# Patient Record
Sex: MALE | Race: WHITE | Employment: FULL TIME | URBAN - METROPOLITAN AREA
[De-identification: names, ages, dates, MRNs, and addresses within clinical notes are randomized per-mention and may not be internally consistent; named-entity substitution may affect disease eponyms.]

---

## 2017-01-13 ENCOUNTER — TELEPHONE (OUTPATIENT)
Dept: NEUROLOGY | Facility: CLINIC | Age: 57
End: 2017-01-13

## 2017-07-13 PROBLEM — K21.9 GASTROESOPHAGEAL REFLUX DISEASE, ESOPHAGITIS PRESENCE NOT SPECIFIED: Status: ACTIVE | Noted: 2017-07-13

## 2017-07-13 PROBLEM — Z72.0 TOBACCO ABUSE: Status: ACTIVE | Noted: 2017-07-13

## 2017-07-13 PROBLEM — F17.200 TOBACCO DEPENDENCE: Status: ACTIVE | Noted: 2017-07-13

## 2017-07-13 PROBLEM — F51.02 ADJUSTMENT INSOMNIA: Status: ACTIVE | Noted: 2017-07-13

## 2017-07-13 PROBLEM — N40.1 BPH WITH OBSTRUCTION/LOWER URINARY TRACT SYMPTOMS: Status: ACTIVE | Noted: 2017-07-13

## 2017-07-13 PROBLEM — I10 ESSENTIAL HYPERTENSION, BENIGN: Status: ACTIVE | Noted: 2017-07-13

## 2017-07-13 PROBLEM — M17.11 PRIMARY OSTEOARTHRITIS OF RIGHT KNEE: Status: ACTIVE | Noted: 2017-07-13

## 2017-07-13 PROBLEM — R73.02 GLUCOSE INTOLERANCE (IMPAIRED GLUCOSE TOLERANCE): Status: ACTIVE | Noted: 2017-07-13

## 2017-07-13 PROBLEM — N13.8 BPH WITH OBSTRUCTION/LOWER URINARY TRACT SYMPTOMS: Status: ACTIVE | Noted: 2017-07-13

## 2017-07-13 PROBLEM — M71.21 BAKER'S CYST OF KNEE, RIGHT: Status: ACTIVE | Noted: 2017-07-13

## 2017-07-13 PROBLEM — E78.00 PURE HYPERCHOLESTEROLEMIA: Status: ACTIVE | Noted: 2017-07-13

## 2017-07-18 PROCEDURE — 82607 VITAMIN B-12: CPT | Performed by: FAMILY MEDICINE

## 2017-07-18 PROCEDURE — 84153 ASSAY OF PSA TOTAL: CPT | Performed by: FAMILY MEDICINE

## 2018-08-22 PROCEDURE — 80074 ACUTE HEPATITIS PANEL: CPT | Performed by: FAMILY MEDICINE

## 2018-08-27 PROBLEM — E55.9 VITAMIN D DEFICIENCY: Status: ACTIVE | Noted: 2018-08-27

## 2018-08-27 PROBLEM — K58.0 IRRITABLE BOWEL SYNDROME WITH DIARRHEA: Status: ACTIVE | Noted: 2018-08-27

## 2019-08-31 NOTE — ED PROVIDER NOTES
Patient Seen in: [facility] Emergency Department    History   Patient presents with:  Eval-D (detox)    Stated Complaint: ETOH    HPI    77-year-old male here for alcohol intoxication.   The patient is not quite sure why he is here he states he only Physical Exam    Patient is alert he is oriented x3 but slurring his words.   HEENT exam is atraumatic pupils are equal round react light extract muscles intact the next nontender lungs are clear cardiovascular exam shows regular rate and rhythm wit results for these tests on the individual orders. RAINBOW DRAW BLUE   RAINBOW DRAW LAVENDER   RAINBOW DRAW LIGHT GREEN   RAINBOW DRAW GOLD          Alcohol level is 176. The patient was observed for about 3 hours was able to ambulate without difficulty.

## 2019-08-31 NOTE — ED NOTES
Pt attempting to get out of bed. Pt redirectable and agreeable to remain in the exam cart. Pt given glass of ice water.

## 2019-08-31 NOTE — ED INITIAL ASSESSMENT (HPI)
Pt presents to ER with ETOH. pts wife called ambulance because \"she could not handle him like this. \" wife states he was drinking all day. Slurred speech. Skin warm and dry. Respirations equal and nonlabored.

## 2019-09-01 NOTE — ED NOTES
Pt sitting on cart. Asking to go home. Per wife \"I can't deal with him right now\" pt aware. Updated on plan.

## 2021-01-01 ENCOUNTER — NURSE ONLY (OUTPATIENT)
Dept: ELECTROPHYSIOLOGY | Facility: HOSPITAL | Age: 61
DRG: 432 | End: 2021-01-01
Attending: HOSPITALIST
Payer: COMMERCIAL

## 2021-01-01 ENCOUNTER — APPOINTMENT (OUTPATIENT)
Dept: ULTRASOUND IMAGING | Facility: HOSPITAL | Age: 61
DRG: 432 | End: 2021-01-01
Attending: EMERGENCY MEDICINE
Payer: COMMERCIAL

## 2021-01-01 ENCOUNTER — APPOINTMENT (OUTPATIENT)
Dept: CT IMAGING | Facility: HOSPITAL | Age: 61
DRG: 432 | End: 2021-01-01
Attending: NURSE PRACTITIONER
Payer: COMMERCIAL

## 2021-01-01 ENCOUNTER — APPOINTMENT (OUTPATIENT)
Dept: CT IMAGING | Facility: HOSPITAL | Age: 61
DRG: 432 | End: 2021-01-01
Attending: EMERGENCY MEDICINE
Payer: COMMERCIAL

## 2021-01-01 ENCOUNTER — APPOINTMENT (OUTPATIENT)
Dept: GENERAL RADIOLOGY | Facility: HOSPITAL | Age: 61
DRG: 432 | End: 2021-01-01
Attending: EMERGENCY MEDICINE
Payer: COMMERCIAL

## 2021-01-01 ENCOUNTER — APPOINTMENT (OUTPATIENT)
Dept: INTERVENTIONAL RADIOLOGY/VASCULAR | Facility: HOSPITAL | Age: 61
DRG: 432 | End: 2021-01-01
Attending: INTERNAL MEDICINE
Payer: COMMERCIAL

## 2021-01-01 ENCOUNTER — HOSPITAL ENCOUNTER (INPATIENT)
Dept: GENERAL RADIOLOGY | Facility: HOSPITAL | Age: 61
Discharge: HOME | DRG: 432 | End: 2021-01-01
Attending: HOSPITALIST
Payer: COMMERCIAL

## 2021-01-01 ENCOUNTER — APPOINTMENT (OUTPATIENT)
Dept: CV DIAGNOSTICS | Facility: HOSPITAL | Age: 61
DRG: 432 | End: 2021-01-01
Attending: INTERNAL MEDICINE
Payer: COMMERCIAL

## 2021-01-01 ENCOUNTER — HOSPITAL ENCOUNTER (INPATIENT)
Facility: HOSPITAL | Age: 61
LOS: 7 days | DRG: 432 | End: 2021-01-01
Attending: EMERGENCY MEDICINE | Admitting: INTERNAL MEDICINE
Payer: COMMERCIAL

## 2021-01-01 ENCOUNTER — APPOINTMENT (OUTPATIENT)
Dept: GENERAL RADIOLOGY | Facility: HOSPITAL | Age: 61
DRG: 432 | End: 2021-01-01
Attending: NURSE PRACTITIONER
Payer: COMMERCIAL

## 2021-01-01 ENCOUNTER — APPOINTMENT (OUTPATIENT)
Dept: CT IMAGING | Facility: HOSPITAL | Age: 61
DRG: 432 | End: 2021-01-01
Attending: INTERNAL MEDICINE
Payer: COMMERCIAL

## 2021-01-01 ENCOUNTER — APPOINTMENT (OUTPATIENT)
Dept: GENERAL RADIOLOGY | Facility: HOSPITAL | Age: 61
DRG: 432 | End: 2021-01-01
Attending: HOSPITALIST
Payer: COMMERCIAL

## 2021-01-01 ENCOUNTER — ANESTHESIA (OUTPATIENT)
Dept: MEDSURG UNIT | Facility: HOSPITAL | Age: 61
DRG: 432 | End: 2021-01-01
Payer: COMMERCIAL

## 2021-01-01 ENCOUNTER — ANESTHESIA EVENT (OUTPATIENT)
Dept: MEDSURG UNIT | Facility: HOSPITAL | Age: 61
DRG: 432 | End: 2021-01-01
Payer: COMMERCIAL

## 2021-01-01 VITALS
OXYGEN SATURATION: 80 % | SYSTOLIC BLOOD PRESSURE: 83 MMHG | BODY MASS INDEX: 34 KG/M2 | TEMPERATURE: 97 F | DIASTOLIC BLOOD PRESSURE: 48 MMHG | WEIGHT: 243.19 LBS

## 2021-01-01 DIAGNOSIS — E87.5 HYPERKALEMIA: ICD-10-CM

## 2021-01-01 DIAGNOSIS — N18.9 ACUTE RENAL FAILURE SUPERIMPOSED ON CHRONIC KIDNEY DISEASE, UNSPECIFIED CKD STAGE, UNSPECIFIED ACUTE RENAL FAILURE TYPE (HCC): ICD-10-CM

## 2021-01-01 DIAGNOSIS — E87.1 HYPONATREMIA: ICD-10-CM

## 2021-01-01 DIAGNOSIS — N17.9 ACUTE RENAL FAILURE SUPERIMPOSED ON CHRONIC KIDNEY DISEASE, UNSPECIFIED CKD STAGE, UNSPECIFIED ACUTE RENAL FAILURE TYPE (HCC): ICD-10-CM

## 2021-01-01 DIAGNOSIS — K72.90 HEPATIC ENCEPHALOPATHY (HCC): Primary | ICD-10-CM

## 2021-01-01 PROCEDURE — 02HV33Z INSERTION OF INFUSION DEVICE INTO SUPERIOR VENA CAVA, PERCUTANEOUS APPROACH: ICD-10-PCS | Performed by: INTERNAL MEDICINE

## 2021-01-01 PROCEDURE — 95720 EEG PHY/QHP EA INCR W/VEEG: CPT | Performed by: OTHER

## 2021-01-01 PROCEDURE — 71045 X-RAY EXAM CHEST 1 VIEW: CPT | Performed by: NURSE PRACTITIONER

## 2021-01-01 PROCEDURE — 3E033XZ INTRODUCTION OF VASOPRESSOR INTO PERIPHERAL VEIN, PERCUTANEOUS APPROACH: ICD-10-PCS | Performed by: INTERNAL MEDICINE

## 2021-01-01 PROCEDURE — 71045 X-RAY EXAM CHEST 1 VIEW: CPT | Performed by: HOSPITALIST

## 2021-01-01 PROCEDURE — 74176 CT ABD & PELVIS W/O CONTRAST: CPT | Performed by: INTERNAL MEDICINE

## 2021-01-01 PROCEDURE — 02HV33Z INSERTION OF INFUSION DEVICE INTO SUPERIOR VENA CAVA, PERCUTANEOUS APPROACH: ICD-10-PCS | Performed by: RADIOLOGY

## 2021-01-01 PROCEDURE — 99233 SBSQ HOSP IP/OBS HIGH 50: CPT | Performed by: OTHER

## 2021-01-01 PROCEDURE — 70450 CT HEAD/BRAIN W/O DYE: CPT | Performed by: EMERGENCY MEDICINE

## 2021-01-01 PROCEDURE — 4A133B1 MONITORING OF ARTERIAL PRESSURE, PERIPHERAL, PERCUTANEOUS APPROACH: ICD-10-PCS | Performed by: INTERNAL MEDICINE

## 2021-01-01 PROCEDURE — 4A133J1 MONITORING OF ARTERIAL PULSE, PERIPHERAL, PERCUTANEOUS APPROACH: ICD-10-PCS | Performed by: INTERNAL MEDICINE

## 2021-01-01 PROCEDURE — B548ZZA ULTRASONOGRAPHY OF SUPERIOR VENA CAVA, GUIDANCE: ICD-10-PCS | Performed by: INTERNAL MEDICINE

## 2021-01-01 PROCEDURE — 5A1D90Z PERFORMANCE OF URINARY FILTRATION, CONTINUOUS, GREATER THAN 18 HOURS PER DAY: ICD-10-PCS | Performed by: INTERNAL MEDICINE

## 2021-01-01 PROCEDURE — 99255 IP/OBS CONSLTJ NEW/EST HI 80: CPT | Performed by: NURSE PRACTITIONER

## 2021-01-01 PROCEDURE — 03HY32Z INSERTION OF MONITORING DEVICE INTO UPPER ARTERY, PERCUTANEOUS APPROACH: ICD-10-PCS | Performed by: INTERNAL MEDICINE

## 2021-01-01 PROCEDURE — 99255 IP/OBS CONSLTJ NEW/EST HI 80: CPT | Performed by: OTHER

## 2021-01-01 PROCEDURE — 93306 TTE W/DOPPLER COMPLETE: CPT | Performed by: INTERNAL MEDICINE

## 2021-01-01 PROCEDURE — 71045 X-RAY EXAM CHEST 1 VIEW: CPT | Performed by: EMERGENCY MEDICINE

## 2021-01-01 PROCEDURE — 70450 CT HEAD/BRAIN W/O DYE: CPT | Performed by: NURSE PRACTITIONER

## 2021-01-01 PROCEDURE — 0BH18EZ INSERTION OF ENDOTRACHEAL AIRWAY INTO TRACHEA, VIA NATURAL OR ARTIFICIAL OPENING ENDOSCOPIC: ICD-10-PCS | Performed by: ANESTHESIOLOGY

## 2021-01-01 PROCEDURE — B548ZZA ULTRASONOGRAPHY OF SUPERIOR VENA CAVA, GUIDANCE: ICD-10-PCS | Performed by: RADIOLOGY

## 2021-01-01 PROCEDURE — 5A1955Z RESPIRATORY VENTILATION, GREATER THAN 96 CONSECUTIVE HOURS: ICD-10-PCS | Performed by: ANESTHESIOLOGY

## 2021-01-01 RX ORDER — DEXTROSE MONOHYDRATE 25 G/50ML
50 INJECTION, SOLUTION INTRAVENOUS AS NEEDED
Status: DISCONTINUED | OUTPATIENT
Start: 2021-01-01 | End: 2021-01-01

## 2021-01-01 RX ORDER — FOLIC ACID 1 MG/1
1 TABLET ORAL DAILY
Status: DISCONTINUED | OUTPATIENT
Start: 2021-01-01 | End: 2021-01-01

## 2021-01-01 RX ORDER — LACTULOSE 20 G/30ML
20 SOLUTION ORAL 3 TIMES DAILY
Status: DISCONTINUED | OUTPATIENT
Start: 2021-01-01 | End: 2021-01-01

## 2021-01-01 RX ORDER — PHENYTOIN SODIUM 50 MG/ML
200 INJECTION, SOLUTION INTRAMUSCULAR; INTRAVENOUS EVERY 8 HOURS SCHEDULED
Status: DISCONTINUED | OUTPATIENT
Start: 2021-01-01 | End: 2021-01-01

## 2021-01-01 RX ORDER — LEVETIRACETAM 500 MG/5ML
1000 INJECTION, SOLUTION, CONCENTRATE INTRAVENOUS ONCE
Status: COMPLETED | OUTPATIENT
Start: 2021-01-01 | End: 2021-01-01

## 2021-01-01 RX ORDER — RUFINAMIDE 40 MG/ML
1 SUSPENSION ORAL DAILY
Status: DISCONTINUED | OUTPATIENT
Start: 2021-01-01 | End: 2021-01-01

## 2021-01-01 RX ORDER — LORAZEPAM 2 MG/ML
2 INJECTION INTRAMUSCULAR EVERY 10 MIN PRN
Status: DISCONTINUED | OUTPATIENT
Start: 2021-01-01 | End: 2021-01-01 | Stop reason: ALTCHOICE

## 2021-01-01 RX ORDER — MELATONIN
100 DAILY
Status: DISCONTINUED | OUTPATIENT
Start: 2021-01-01 | End: 2021-01-01

## 2021-01-01 RX ORDER — LORAZEPAM 2 MG/ML
INJECTION INTRAMUSCULAR
Status: COMPLETED
Start: 2021-01-01 | End: 2021-01-01

## 2021-01-01 RX ORDER — DEXTROSE MONOHYDRATE 25 G/50ML
INJECTION, SOLUTION INTRAVENOUS
Status: COMPLETED
Start: 2021-01-01 | End: 2021-01-01

## 2021-01-01 RX ORDER — LORAZEPAM 2 MG/ML
2 INJECTION INTRAMUSCULAR
Status: DISCONTINUED | OUTPATIENT
Start: 2021-01-01 | End: 2021-01-01 | Stop reason: ALTCHOICE

## 2021-01-01 RX ORDER — SODIUM CHLORIDE 9 MG/ML
INJECTION, SOLUTION INTRAVENOUS CONTINUOUS
Status: DISCONTINUED | OUTPATIENT
Start: 2021-01-01 | End: 2021-01-01

## 2021-01-01 RX ORDER — POLYETHYLENE GLYCOL 3350 17 G/17G
17 POWDER, FOR SOLUTION ORAL DAILY PRN
Status: DISCONTINUED | OUTPATIENT
Start: 2021-01-01 | End: 2021-01-01

## 2021-01-01 RX ORDER — HEPARIN SODIUM 5000 [USP'U]/ML
INJECTION, SOLUTION INTRAVENOUS; SUBCUTANEOUS
Status: COMPLETED
Start: 2021-01-01 | End: 2021-01-01

## 2021-01-01 RX ORDER — POTASSIUM CHLORIDE 14.9 MG/ML
20 INJECTION INTRAVENOUS ONCE
Status: DISCONTINUED | OUTPATIENT
Start: 2021-01-01 | End: 2021-01-01

## 2021-01-01 RX ORDER — CHLORHEXIDINE GLUCONATE 0.12 MG/ML
15 RINSE ORAL
Status: DISCONTINUED | OUTPATIENT
Start: 2021-01-01 | End: 2021-01-01

## 2021-01-01 RX ORDER — LORAZEPAM 2 MG/ML
1 INJECTION INTRAMUSCULAR ONCE
Status: COMPLETED | OUTPATIENT
Start: 2021-01-01 | End: 2021-01-01

## 2021-01-01 RX ORDER — LEVETIRACETAM 500 MG/5ML
1000 INJECTION, SOLUTION, CONCENTRATE INTRAVENOUS EVERY 12 HOURS
Status: DISCONTINUED | OUTPATIENT
Start: 2021-01-01 | End: 2021-01-01

## 2021-01-01 RX ORDER — BISACODYL 10 MG
10 SUPPOSITORY, RECTAL RECTAL
Status: DISCONTINUED | OUTPATIENT
Start: 2021-01-01 | End: 2021-01-01

## 2021-01-01 RX ORDER — LORAZEPAM 2 MG/ML
3 INJECTION INTRAMUSCULAR
Status: DISCONTINUED | OUTPATIENT
Start: 2021-01-01 | End: 2021-01-01 | Stop reason: ALTCHOICE

## 2021-01-01 RX ORDER — HEPARIN SODIUM 1000 [USP'U]/ML
1.5 INJECTION, SOLUTION INTRAVENOUS; SUBCUTANEOUS AS NEEDED
Status: DISCONTINUED | OUTPATIENT
Start: 2021-01-01 | End: 2021-01-01

## 2021-01-01 RX ORDER — MIDAZOLAM HYDROCHLORIDE 1 MG/ML
2 INJECTION INTRAMUSCULAR; INTRAVENOUS ONCE
Status: COMPLETED | OUTPATIENT
Start: 2021-01-01 | End: 2021-01-01

## 2021-01-01 RX ORDER — LEVETIRACETAM 500 MG/5ML
500 INJECTION, SOLUTION, CONCENTRATE INTRAVENOUS EVERY 12 HOURS
Status: DISCONTINUED | OUTPATIENT
Start: 2021-01-01 | End: 2021-01-01

## 2021-01-01 RX ORDER — ALBUMIN (HUMAN) 12.5 G/50ML
25 SOLUTION INTRAVENOUS EVERY 6 HOURS
Status: COMPLETED | OUTPATIENT
Start: 2021-01-01 | End: 2021-01-01

## 2021-01-01 RX ORDER — LORAZEPAM 2 MG/ML
1 INJECTION INTRAMUSCULAR EVERY 30 MIN PRN
Status: DISCONTINUED | OUTPATIENT
Start: 2021-01-01 | End: 2021-01-01 | Stop reason: ALTCHOICE

## 2021-01-01 RX ORDER — ETOMIDATE 2 MG/ML
INJECTION INTRAVENOUS
Status: COMPLETED
Start: 2021-01-01 | End: 2021-01-01

## 2021-01-01 RX ORDER — OCTREOTIDE ACETATE 100 UG/ML
100 INJECTION, SOLUTION INTRAVENOUS; SUBCUTANEOUS EVERY 8 HOURS
Status: DISCONTINUED | OUTPATIENT
Start: 2021-01-01 | End: 2021-01-01

## 2021-01-01 RX ORDER — SENNOSIDES 8.8 MG/5ML
10 LIQUID ORAL NIGHTLY PRN
Status: DISCONTINUED | OUTPATIENT
Start: 2021-01-01 | End: 2021-01-01

## 2021-01-01 RX ORDER — ETOMIDATE 2 MG/ML
10 INJECTION INTRAVENOUS ONCE
Status: COMPLETED | OUTPATIENT
Start: 2021-01-01 | End: 2021-01-01

## 2021-01-01 RX ORDER — LORAZEPAM 2 MG/ML
4 INJECTION INTRAMUSCULAR EVERY 10 MIN PRN
Status: DISCONTINUED | OUTPATIENT
Start: 2021-01-01 | End: 2021-01-01 | Stop reason: ALTCHOICE

## 2021-01-01 RX ORDER — POTASSIUM CHLORIDE 14.9 MG/ML
20 INJECTION INTRAVENOUS ONCE
Status: COMPLETED | OUTPATIENT
Start: 2021-01-01 | End: 2021-01-01

## 2021-01-01 RX ORDER — PANTOPRAZOLE SODIUM 40 MG/1
40 TABLET, DELAYED RELEASE ORAL
Status: DISCONTINUED | OUTPATIENT
Start: 2021-01-01 | End: 2021-01-01

## 2021-01-01 RX ORDER — MIDODRINE HYDROCHLORIDE 10 MG/1
10 TABLET ORAL 3 TIMES DAILY
Status: DISCONTINUED | OUTPATIENT
Start: 2021-01-01 | End: 2021-01-01

## 2021-01-01 RX ORDER — LIDOCAINE HYDROCHLORIDE 10 MG/ML
INJECTION, SOLUTION INFILTRATION; PERINEURAL
Status: COMPLETED
Start: 2021-01-01 | End: 2021-01-01

## 2021-01-01 RX ORDER — LORAZEPAM 2 MG/ML
0.5 INJECTION INTRAMUSCULAR ONCE
Status: COMPLETED | OUTPATIENT
Start: 2021-01-01 | End: 2021-01-01

## 2021-01-01 RX ORDER — PHENYTOIN SODIUM 50 MG/ML
100 INJECTION, SOLUTION INTRAMUSCULAR; INTRAVENOUS EVERY 8 HOURS SCHEDULED
Status: DISCONTINUED | OUTPATIENT
Start: 2021-01-01 | End: 2021-01-01

## 2021-01-01 RX ORDER — LACTULOSE 20 G/30ML
30 SOLUTION ORAL 3 TIMES DAILY
Status: DISCONTINUED | OUTPATIENT
Start: 2021-01-01 | End: 2021-01-01

## 2021-01-01 RX ORDER — PHENYTOIN SODIUM 50 MG/ML
150 INJECTION, SOLUTION INTRAMUSCULAR; INTRAVENOUS EVERY 8 HOURS SCHEDULED
Status: DISCONTINUED | OUTPATIENT
Start: 2021-01-01 | End: 2021-01-01

## 2021-01-01 RX ORDER — MELATONIN
100 DAILY
Status: DISCONTINUED | OUTPATIENT
Start: 2021-10-15 | End: 2021-01-01

## 2021-10-04 PROBLEM — E87.5 HYPERKALEMIA: Status: ACTIVE | Noted: 2021-01-01

## 2021-10-04 PROBLEM — K72.90 HEPATIC ENCEPHALOPATHY (HCC): Status: ACTIVE | Noted: 2021-01-01

## 2021-10-04 PROBLEM — N17.9 ACUTE RENAL FAILURE SUPERIMPOSED ON CHRONIC KIDNEY DISEASE, UNSPECIFIED CKD STAGE, UNSPECIFIED ACUTE RENAL FAILURE TYPE (HCC): Status: ACTIVE | Noted: 2021-01-01

## 2021-10-04 PROBLEM — N18.9 ACUTE RENAL FAILURE SUPERIMPOSED ON CHRONIC KIDNEY DISEASE, UNSPECIFIED CKD STAGE, UNSPECIFIED ACUTE RENAL FAILURE TYPE (HCC): Status: ACTIVE | Noted: 2021-01-01

## 2021-10-04 PROBLEM — E87.1 HYPONATREMIA: Status: ACTIVE | Noted: 2021-01-01

## 2021-10-04 NOTE — PLAN OF CARE
Received pt from the ER. Pt awakens to voice, but doesn't follow any commands. Wife at bedside and she provided history. Updates given. Bp dropping started on levo. Ngt placed. Will continue to monitor. See flowsheets.

## 2021-10-04 NOTE — ED NOTES
Patient in from home via ems. Per ems, patient has been feeling sick for a couple weeks. Wife states patient has not been eating. Patient w/ jaundice, rounded belly, swollen extremities.  Awake and alert, not responding to  line w/ [facility] in

## 2021-10-04 NOTE — H&P
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 , Disp: , Rfl:   Multiple Vitamin (ONE-DAILY MULTI VITAMINS) Oral Tab, Take 1 tablet by mouth daily. , Disp: , Rfl:         Review of Systems:   A comprehensive 14 point review of systems was completed.     Pertinent positives and negatives no Encephalopathy  -pt with significant alcohol history, concerning for cirrhosis development  -ammonia 145  -lactulose   -GI consulted    Hyponatremia  -etiology uncertain  -urine studies  -nephrology consulted    GELA  -likely secondary to pre renal state fr

## 2021-10-04 NOTE — CONSULTS
Critical Care H&P/Consult       NAME: Erich Sanchez - ROOM: 832/698-Y - MRN: [id number] - Age: 64year old - :  3/28/1960    Date of Admission: 10/4/2021 11:16 AM  Admission Diagnosis: [facility] [K72.90]  Hyperkalemia [E87.5]  Hyponatr Take 1 tablet (10 mg total) by mouth daily. , Disp: 90 tablet, Rfl: 1  losartan 100 MG Oral Tab, TAKE ONE TABLET BY MOUTH DAILY. , Disp: 90 tablet, Rfl: 0  Thiamine 50 MG Oral Cap, Take 1 capsule by mouth daily. , Disp: 90 capsule, Rfl: 3  OMEPRAZOLE 20 MG Or week      Drug use: No      Sexual activity: Yes        Partners: Female    Other Topics      Concerns:         Service: Yes          [place]        Blood Transfusions: No        Caffeine Concern: Yes          3 cups/day        Occupational [employer]ur of Onset   • Heart Disease Father    • Diabetes Father    • Other (Other) Father         leg amputated   • Macular degeneration Mother    • Prostate Cancer Maternal Grandfather    • Infectious Disease Paternal Grandmother         post op   • Heart Attack P Abdomen:     Soft, non-tender, bowel sounds active all four quadrants,     no masses, no organomegaly   Extremities:   2+ edema   Pulses:   2+ and symmetric all extremities   Skin:   Jaundiced appearing   Neurologic:   CNII-XII intact.  Normal strength, s

## 2021-10-04 NOTE — ED QUICK NOTES
Patients emergency contact called, Claudio Johnson. Per Claudio Johnson, patient is usually awake and alert, speaks english well. On July 4th, patient started feeling \"weird. \" He has not been able to shower for weeks, has not been eating, and has been feeling sick.  This

## 2021-10-04 NOTE — CONSULTS
[facility]    Report of Consultation    Erasmo Interiano Patient Status:  Inpatient    3/28/1960 MRN [id number]   [facility] 4SW-A Attending Alton Coffey, DO   Hosp Day # 0 PCP Yonathan Wiseman MD       REASON FOR CONSULT:     GELA  Hy Attack Paternal Grandfather       reports that he quit smoking about 17 months ago. His smoking use included cigarettes. He has a 20.00 pack-year smoking history.  He has never used smokeless tobacco. He reports current alcohol use of about 20.0 standard dr 10/04/2021    ALB 1.5 (L) 10/04/2021    GLOBULIN 4.2 10/04/2021     (L) 10/04/2021    K 5.2 (H) 10/04/2021    CL 90 (L) 10/04/2021    CO2 15.0 (L) 10/04/2021     Lab Results   Component Value Date    WBC 9.0 10/04/2021    RBC 2.98 (L) 10/04/2021    H contrast  -- start empiric albumin 25% in case of HRS  -- give thiamine now in case of need for D5 containing IV fluids  -- continue jeffries  -- avoid NSAIDs, IV contrast, fleets  -- no indication for RRT at this time but will monitor for need    Hyponatremi

## 2021-10-04 NOTE — ED PROVIDER NOTES
Patient Seen in: [facility] Emergency Department      History   Patient presents with:  Jaundice  Altered Mental Status    Stated Complaint: AMS    Subjective:   HPI    Patient is a 31-year-old male brought by paramedics for altered mental status. Abdomen is soft. Comments: Moderate diffuse distention. Does not seem tender. Musculoskeletal:         General: Normal range of motion. Cervical back: Normal range of motion and neck supple. Skin:     General: Skin is warm and dry.       Comm components within normal limits   CBC W/ DIFFERENTIAL - Abnormal; Notable for the following components:    RBC 2.98 (*)     HGB 10.1 (*)     HCT 27.2 (*)     PLT 97.0 (*)     MCHC 37.1 (*)     Lymphocyte Absolute 0.74 (*)     All other components within no There is no midline shift. There are no intraparenchymal brain abnormalities. There is nothing specific for acute infarct. There is no hemorrhage or mass lesion. SINUSES:           No sign of acute sinusitis.   MASTOIDS:          No sign of acute inflam Per old records/social histories he does have fairly heavy alcohol use in the past and overall this is suspicious for possible cirrhosis with hepatic encephalopathy. Blood pressure is little bit low at 85/51.   Labs ordered including coags, ammonia, blood minutes of critical care time (exclusive of billable procedures) was administered to manage the patient's critical lab values due to his acute renal failure, hyponatremia, hepatic encephalopathy, hypotension.   This involved direct patient intervention, com

## 2021-10-05 NOTE — PROGRESS NOTES
[facility] Pharmacy Note:  Initiation of Stress Ulcer Prophylaxis     Cameron Kam is a 64year old patient and meets criteria for the initiation of stress ulcer prophylaxis based on the presence of: Medication(s) on home medication list.    pantoprazole (PROTON

## 2021-10-05 NOTE — PAYOR COMM NOTE
--------------  ADMISSION REVIEW     Payor: [facility]  Subscriber #:  [id number]  Authorization Number: [id number]      Admit date: 10/4/21  Admit time:  3:53 PM       History   HPI    Patient is a 35-year-old male brought by paramedics for a Difficult to assess the rest of his cranial nerves. Psychiatric:      Comments: Restless in bed. Not combative or agitated.      Labs Reviewed   COMP METABOLIC PANEL (14) - Abnormal; Notable for the following components:       Result Value    Glucose 122 Disposition:  Admit  10/4/2021  2:44 pm    History and Physical   Chief Complaint: AMS  History of Present Illness: Erasmo Interiano is a 64year old male with history of gerd, htn, hld and glucose intolerance presenting with AMS.  Patient is unable to giv AMS.     AMS  -etiology uncertain  -likely multifactorial  -ct brain negative  -hyponatremia  -hepatic encephalopathy  -other  -may need neuro consult if not improving with below treatment in 1-2 days, consider EEG also given possible withdrawal pathology encephalopathy vs uremia  -EtOH negative  -treat ammonia level- rising this AM, increased lactulose  -consider EEG for possible post-ictal seizure activity if no improvement- no seizure activity witnessed as of yet  2.  Lactic Acidosis  -due to hypotension Action Dose Route     10/4/2021 2116 Given  Oral       LORazepam (ATIVAN) injection 0.5 mg     Date Action Dose Route     10/4/2021 1446 Given  Intravenous       norepinephrine (LEVOPHED) 4 mg/250 ml premix infusion     Date Action Dose Route     10/5/2021

## 2021-10-05 NOTE — PROCEDURES
[facility]  Procedure Note    Teodora Walter Patient Status:  Inpatient    3/28/1960 MRN [id number]   Location [facility] Attending Luis Alfredo Hernadez MD   Hosp Day # 1 PCP Sammi Jay MD     Procedure: right internal jugul

## 2021-10-05 NOTE — PROGRESS NOTES
[facility]      Sepsis Reassessment Note    BP 99/60   Pulse 85   Temp 98.1 °F (36.7 °C) (Temporal)   Resp 19   Wt 227 lb 1.2 oz (103 kg)   SpO2 100%   BMI 32.12 kg/m²      I completed the sepsis reassessment at 1630    Cardiac:  Regularity: Regular

## 2021-10-05 NOTE — DIETARY MALNUTRITION NOTE
[facility]    NUTRITION ASSESSMENT    Pt meets moderate malnutrition criteria.     CRITERIA FOR MALNUTRITION DIAGNOSIS:  Criteria for non-severe malnutrition diagnosis: chronic illness related to energy intake less than75% for greater than 1 month and lb)  08/27/18 : 103.4 kg (228 lb)       NUTRITION:  Diet:NPO + TF   Oral Supplements: none at this time    Percent Meals Eaten (last 3 days)     Date/Time Percent Meals Eaten (%)    10/05/21 0800 0 %        FOOD/NUTRITION RELATED HISTORY:   Appetite: Poor

## 2021-10-05 NOTE — PLAN OF CARE
Assumed care while patient in bed. CT scan of abdomen/pelvis done at beginning of shift. Levo titrated per MD orders for pressure support. Does not follow any commands, responds to stimulation, moans. Room air. Afebrile. NSR.  NG tube in place connected to

## 2021-10-05 NOTE — PROGRESS NOTES
[facility] Pharmacy Note:  Renal Dose Adjustment (CRRT)    Pharmacy has been asked to review medications for appropriateness for CRRT. Renal Replacement fluid rate is 2000 mL/hr. Based on this replacement rate, the CrCl is estimated to be 33 mL/min.     There

## 2021-10-05 NOTE — PROGRESS NOTES
[facility]    Nephrology Progress Note    Taylor Najera Attending:  Nichelle Whitfield MD       SUBJECTIVE:     Started on levophed  Not arousable to voice    PHYSICAL EXAM:     Vital Signs: /73   Pulse 89   Temp 98.2 °F (36.8 °C) (Temporal)   Resp 8.8 10/05/2021    EOPERCENT 1.6 10/05/2021    BAPERCENT 0.3 10/05/2021    NE 7.10 10/05/2021    LYMABS 0.81 (L) 10/05/2021    MOABSO 0.79 10/05/2021    EOABSO 0.14 10/05/2021    BAABSO 0.03 10/05/2021     Lab Results   Component Value Date    MALBP 73.80 1 with AMS. He is unable to provide history due to his mental status. His wife provides history.  Nephrology consulted for GELA to Cr 4.95 mg/dl, with Na 122, K 5.2, HCO3 15.     GELA on CKD stage 2: urine sodium 6, 1.3 g UPCR, CT a/p without obstruction  -- em

## 2021-10-05 NOTE — PLAN OF CARE
Received pt obtunded. Does not respond to name or painful stimuli to BUE. Moves BLE to painful stimuli. Jaundice, sclera yellow. Pupils equal and reactive. RA. Levophed titrated for goal of MAP>65 or SBP>90. Andersen in place with minimal lucy output.  CRRT s

## 2021-10-05 NOTE — PROGRESS NOTES
[facility] Hospitalist Progress Note                                                                   [address]  3/28/1960    SUBJECTIVE: Girish Tammy Goodrich is nonresponsive aside from some withdrawal pantoprazole  40 mg Oral QAM AC   • Albumin Human  25 g Intravenous Q6H   • cefTRIAXone  2 g Intravenous O56I   • folic acid  1 mg Oral Daily     Continuous Infusions:   • PrismaSol BGK 2/3.5     • norepinephrine 4 mcg/min (10/05/21 0446)   • vasopressin ( improved today      Lactic acidosis  --suspect due to shock  --continue IVFs  --trend, should improve s/p CRRT     Hyperglycemia  --likely due to acute stress  --HbA1c 5.2 so doubt diabetes  --accuchecks with SSI prn     Macrocytic anemia/thrombocytopenia

## 2021-10-05 NOTE — CONSULTS
[address] Patient Status:  Inpatient    3/28/1960 MRN [id number]   [facility] 4SW-A Attending Kenton Cotter MD   Hosp Day # 1 PCP Nehemiah Reagan MD       Robert Bond is a 64year old male for cirrhosis HE consult. Vaping Use      Vaping Use: Some days    Alcohol use:  Yes      Alcohol/week: 20.0 standard drinks      Types: 20 Glasses of wine per week    Drug use: No    Occupation:    FAMILY HX: GI HX: None, no hx of colon cancer  Family History   Problem Relation Ag

## 2021-10-05 NOTE — PROGRESS NOTES
Critical Care Progress Note        NAME: Robert Bond - ROOM: [facility] - MRN: [id number] - Age: 64year old - : 3/28/1960  Date of Admission: 10/4/2021 11:16 AM  Admission Diagnosis: Hepatic encephalopathy ([facility] [K72.90]  Hyperkalemia [E87.5]  Hyponatr reviewed as noted below        ASSESSMENT/PLAN:  1.  AMS  -hepatic encephalopathy vs uremia  -EtOH negative  -treat ammonia level- rising this AM, increased lactulose  -consider EEG for possible post-ictal seizure activity if no improvement- no seizure acti

## 2021-10-06 NOTE — ANESTHESIA PREPROCEDURE EVALUATION
PRE-OP EVALUATION    Patient Name: Erich Sanchez    Admit Diagnosis: Hepatic encephalopathy ([facility] [K72.90]  Hyperkalemia [E87.5]  Hyponatremia [E87.1]  Acute renal failure superimposed on chronic kidney disease, unspecified CKD stage, unspecified acute derek Intravenous, Q10 Min PRN  [START ON 10/8/2021] thiamine (Vitamin B-1) tab 100 mg, 100 mg, Oral, Daily  Multivitamin Chewtab (ADULT) (CENTRUM) 1 tablet, 1 tablet, Oral, Daily  etomidate (AMIDATE) solution 10 mg, 10 mg, Intravenous, Once  succinylcholine chl PRN  0.9% NaCl infusion, , Intravenous, Continuous  [COMPLETED] patiromer (VELTASSA) 25.2 g packet 25.2 g, 25.2 g, Oral, Once        Outpatient Medications:   TIZANIDINE 2 MG Oral Tab, TAKE 1 TABLET(2 MG) BY MOUTH EVERY 6 HOURS AS NEEDED, Disp: 60 tablet, total) by mouth daily. , Disp: 30 capsule, Rfl: 11, More than a month at Unknown time        Allergies: Patient has no known allergies.       Anesthesia Evaluation        Anesthetic Complications           GI/Hepatic/Renal      (+) GERD 10/04/21  1639 10/05/21  0427 10/05/21  1422 10/05/21  2324   *   < > 132* 123* 127*   BUN 54*   < > 55* 52* 47*   CREATSERUM 4.95*   < > 5.09* 4.96* 4.43*   GFRAA 14*   < > 13* 13* 15*   GFRNAA 12*   < > 11* 12* 13*   CA 8.0*   < > 7.9* 7.5* 7.8*

## 2021-10-06 NOTE — PROGRESS NOTES
[facility]    Nephrology Progress Note    Taylor Najera Attending:  Nichelle Whitfield MD       SUBJECTIVE:   CVVH x 3 hrs then clotted  Intubated for airway protection  On levophed  Concern for seizure - on EEG monitoring    PHYSICAL EXAM:     Vital Sig 8.8 10/05/2021    EOPERCENT 1.6 10/05/2021    BAPERCENT 0.3 10/05/2021    NE 7.10 10/05/2021    LYMABS 0.81 (L) 10/05/2021    MOABSO 0.79 10/05/2021    EOABSO 0.14 10/05/2021    BAABSO 0.03 10/05/2021     Lab Results   Component Value Date    MALBP 73.80 1 PRN  LORazepam (ATIVAN) injection 3 mg, 3 mg, Intravenous, Q15 Min PRN  LORazepam (ATIVAN) injection 4 mg, 4 mg, Intravenous, Q10 Min PRN  [START ON 10/8/2021] thiamine (Vitamin B-1) tab 100 mg, 100 mg, Oral, Daily  Multivitamin Chewtab (ADULT) (CENTRUM) 1 fluids given pulmonary edema on CXR  -- norepi for BP support  -- continue jeffries  -- avoid NSAIDs, IV contrast, fleets  -- start CVVH 10/5/21, continue today net 0     Hyponatremia:  -- now on CVVH, with slow dialysate to avoid overcorrection    Hyperkalem

## 2021-10-06 NOTE — PROGRESS NOTES
Critical Care Progress Note        NAME: Marleni Mason - ROOM: [address] [place] [address] - MRN: [id number] - Age: 64year old - : 3/28/1960  Date of Admission: 10/4/2021 11:16 AM  Admission Diagnosis: Hepatic encephalopathy ([facility] [K72.90]  Hyperkalemia [E87.5]  Hyponatr Medication:  • fentanyl 50 mcg/hr (10/06/21 0450)   • propofol 70 mcg/kg/min (10/06/21 0603)   • PrismaSol BGK 2/3.5 2 L/hr (10/05/21 5878)   • norepinephrine 13 mcg/min (10/06/21 0617)   • vasopressin (PITRESSIN) infusion for septic shock     • sodium chl TFs  13.  Dispo  -ICU       Critical Care Time greater than: [facility] Pulmonary and Critical Care

## 2021-10-06 NOTE — PROGRESS NOTES
Patient remains obtunded with minimal response to noxious stimuli--suspect much related to hepatic encephalopathy and elevated ammonia levels, also could consider potential seizure or post-ictal state.     Snoring breathing pattern noted with labored/forced

## 2021-10-06 NOTE — PROGRESS NOTES
LTM video EEG 24 hour start of record, prelim read:    Burst suppression pattern likely related to sedation, with 4 episodes lasting 20 seconds of predominantly right frontal rhythmic large amplitude 1 per second slow wave activity.  No spike and wave or cl

## 2021-10-06 NOTE — PROGRESS NOTES
[facility] Hospitalist Progress Note                                                                   [address]  3/28/1960    SUBJECTIVE: Mr. Dmitri Courtney remained unresponsive yesterday and was not 4.39*   CA 7.8* 7.9* 7.5* 7.8* 7.3*   MG  --  2.3  --   --  2.1   PHOS  --  3.9  --   --  4.6   * 132* 123* 127* 122*       Recent Labs   Lab 10/04/21  1334 10/05/21  0427 10/06/21  0456   ALT 48  --  30   *  --  62*   ALB 1.5* 2.2* 2.7* Jorge A Gupta  --monitor daily ammonia level, improving today  --hyponatremia likely contributing to his altered mentation as well  --CT head with no acute findings but may need to be repeated or even MRI obtained due to new seizure activity  --continue empiric hig signs of active bleeding  --B12 normal, iron on low side  --ferritin very high, consider evaluation for hemochromatosis per GI  --platelets stable today  --monitor daily CBC and transfuse to maintain Hb > 7 and platelets > 20     FEN/Prophy  --no IVFs  --N

## 2021-10-06 NOTE — PAYOR COMM NOTE
--------------  CONTINUED STAY REVIEW    Payor: [address] [facility]  Subscriber #:  [id number]  Authorization Number: [id number]      Admit date: 10/4/21  Admit time:  3:53 PM    FAXING CLINICAL UPDATE FOR 10/6/21    10/6/21   Last 24hrs: Overnight [facility]  9. Anemia/Thrombocytopenia  -due to EtOH abuse  -monitor and transfuse as needed  10. Pulm HTN  -noted on echo  -recommend sleep study as opt  11. Proph  -heparin  12.  FEN  -NPO due to AMS  - TFs     WBC 6.3 10/06/2021     HGB 7.1 10/06/2021     HCT 19 Given  Intravenous       fentaNYL citrate (SUBLIMAZE) 0.05 MG/ML injection 50 mcg     Date Action Dose Route     10/6/2021 0514 Given  Intravenous     10/6/2021 0442 Given  Intravenous       folic acid (FOLVITE) tab 1 mg     Date Action Dose Route     10/6 Chloride (PF) 0.9 % 10 mL IV push     Date Action Dose Route     10/6/2021 0615 Given  Intravenous       PRISMASOL BGK 2/3.5 CRRT/PIRRT fluid 5000mL     Date Action Dose Route     10/5/2021 1748 New Bag  CRRT       propofol (DIPRIVAN) infusion     Date Act

## 2021-10-06 NOTE — RESPIRATORY THERAPY NOTE
ART Line placed on right radial using 20 G arrow catheter. Pt tolerated well without any complications. Line placed with good pulsation and pullback.

## 2021-10-06 NOTE — ANESTHESIA PROCEDURE NOTES
Airway  Date/Time: 10/6/2021 3:45 AM  Urgency: emergent    Airway not difficult    General Information and Staff    Patient location during procedure: ICU  Anesthesiologist: Lesvia Harris MD  Performed: anesthesiologist     Consent for Airway (if

## 2021-10-06 NOTE — PLAN OF CARE
Pt is obtunded. Minimally responsive to painful stimuli. Responsiveness changed at the beginning of this shift. Movement appears more non-purposeful. Eyes have a deviated gaze. APN aware and at bedside to assess. Neuro consulted.  Attempted to page on call

## 2021-10-06 NOTE — PLAN OF CARE
Received pt orally intubated. Sedated with Fentanyl, titrated off Propofol in the am for an EEG per Neuro MD request. Levophed was able to be titrated off with Propofol off. A-Line placed for better BP management.  Pupils equal and reactive, no corneal, no

## 2021-10-06 NOTE — CONSULTS
[facility]    Report of Consultation    Erasmo Interiano Patient Status:  Inpatient    3/28/1960 MRN [id number]   [facility] 4SW-A Attending Grant Gomez MD   Hosp Day # 2 PCP Yonathan Wiseman MD     Date of Admission:  10/4/2021  Date that he quit smoking about 17 months ago. His smoking use included cigarettes. He has a 20.00 pack-year smoking history. He has never used smokeless tobacco. He reports current alcohol use of about 20.0 standard drinks of alcohol per week.  He reports that (ACTIVASE) 2 mg in Sterile Water for Injection 2.2 mL IV push, 2 mg, Intracatheter, Once PRN  •  PRISMASOL BGK 2/3.5 CRRT/PIRRT fluid 5000mL, 2-5 L/hr, CRRT, Continuous **AND** CRRT Replacement Fluid 2/3.5, , , Until Discontinued  •  lactulose (ENULOSE) so 10/06/2021    ALB 2.7 10/06/2021    TP 5.9 10/06/2021         Prior pertinent laboratory work-up:    INR 1.742  Na 129 <--126    CT head 10/4/21  FINDINGS:     VENTRICLES/SULCI:  Ventricles and sulci are normal in size for age with mild atrophy pres for rhythmic slow wave activity seen on EEG. Continue LTM EEG for today. Avoid too high doses if possible due to renal failure. Most other AED's are hepatic metabolized, but dilantin preferred if needed, can measure levels.     Encephalopathy- hepatic, hypo

## 2021-10-06 NOTE — PROGRESS NOTES
[facility]  Progress Note    Kimberly Gupta Patient Status:  Inpatient    3/28/1960 MRN [id number]   [facility] 4SW-A Attending Luca Pyle MD   Hosp Day # 2 PCP Winter Booth MD     Subjective:  Kimberly Gupta is a(n) 64year old m unspecified acute renal failure type (HCC)     Hyponatremia     Hyperkalemia       Erum Ayon is a(n) 64year old male. Pt with ET?OH cirrhosis ARF resp failure, Sz? Has ascites on pressors poor candidate for LVP due to BP.  On empiric Ceftriaxone so di

## 2021-10-07 NOTE — PROGRESS NOTES
[facility] Hospitalist Progress Note                                                                   [address]  3/28/1960    SUBJECTIVE: Mr. Michaelle Parmar continued to have encephalographic seizure 4.39* 3.11*  --  2.51* 2.51*   CA 7.9*   < > 7.8* 7.3* 8.9  --  8.8 8.7   MG 2.3  --   --  2.1  --   --  1.9  --    PHOS 3.9  --   --  4.6 3.3  --  2.8 3.2   *   < > 127* 122* 118*  --  132* 159*    < > = values in this interval not displayed. fentanyl, PEG 3350, senna, bisacodyl, LORazepam, LORazepam, LORazepam, LORazepam, norepinephrine, lactulose (ENULOSE) 200 gm retention enema, heparin sodium, alteplase (CATHFLO) IV push 2 mg to DECLOT line     CT BRAIN OR HEAD (44641)    Result Date: 10/6/ today  --monitor daily ammonia level, worse today  --hyponatremia likely contributing to his altered mentation as well, improving with CRRT  --CT head with no acute findings  --continue empiric high dose IV thiamine for any Wernicke's encephalopathy day 3/ above  --improving with CRRT, continue to monitor     Hyperglycemia  --likely due to acute stress  --HbA1c 5.2 so doubt diabetes  --accuchecks with SSI prn     Macrocytic anemia/thrombocytopenia  --due to cirrhosis, possibly acute Hb drop was due to CRRT c

## 2021-10-07 NOTE — PROGRESS NOTES
Pt remains intubated. Tolerating current settings. Pt remains sedated. No reflexes present on assessment. No outward seizure activity noted. Seizure activity noted on EEG per neuro MD. High dose Versed gtt started and maxed.  Attempting to titrated Propofol

## 2021-10-07 NOTE — DIETARY NOTE
[facility]    NUTRITION ASSESSMENT    Pt meets moderate malnutrition criteria.     CRITERIA FOR MALNUTRITION DIAGNOSIS:  Criteria for non-severe malnutrition diagnosis: chronic illness related to energy intake less than75% for greater than 1 month and hx.    Patient Weight(s) for the past 336 hrs:   Weight   10/05/21 0330 103 kg (227 lb 1.2 oz)   10/04/21 1559 101.8 kg (224 lb 6.9 oz)       Wt Readings from Last 10 Encounters:  10/05/21 : 103 kg (227 lb 1.2 oz)  05/14/21 : 96.2 kg (212 lb)  12/04/19 : 9 Acid, MVI, Solu-Cortef, Albumin, IV abx, Lactulose, Octreotide    LABS:  Glu:159, Na++:130, K+:4.2 (previously 5.4), BUN:24, Cr:2.51, GFR:27, elevated LFT's, NH3:193 (trending down), T    Pt is at High nutrition risk    FOLLOW-UP DATE: 10/11    Stefan Chamberlain

## 2021-10-07 NOTE — PROGRESS NOTES
[facility]  Progress Note    Yuridia Valles Patient Status:  Inpatient    3/28/1960 MRN [id number]   [facility] 4SW-A Attending Kaity Maya MD   Hosp Day # 3 PCP Yu Garcia MD     Subjective:  Yuridia Valles is a(n) 64year old m is a(n) 64year old male. Pt with ETOH cirrhosis ascites. Renal failure on CVVD HE on Lactulose. On pressors status epilepticus. Gravely ill      Plan:    1. Cont supportive care.   2.  Empiric Ceftriaxone for SBP coverage, cont Lactulose      Liz Hunter

## 2021-10-07 NOTE — PROGRESS NOTES
10/07/21 0316   Vent Information   $ RT Standby Charge (per 15 min) 1   Ventilator Initiation 10/06/21   Ventilation Day(s) 2   Interface Invasive   Vent Type    Vent -8   Vent Mode VC+   Settings   FiO2 (%) 40 %   Resp Rate (Set) 16   Vt (Se monitor.

## 2021-10-07 NOTE — PROGRESS NOTES
[facility]    Nephrology Progress Note    Taylor Najera Attending:  Nichelle Whitfield MD       SUBJECTIVE:     Continues to have seizures. He is on 3 pressors now. CVVH ongoing.     PHYSICAL EXAM:     Vital Signs: BP 97/60   Pulse 68   Temp (!) 96.1 °F BAPERCENT 0.3 10/05/2021    NE 7.10 10/05/2021    LYMABS 0.81 (L) 10/05/2021    MOABSO 0.79 10/05/2021    EOABSO 0.14 10/05/2021    BAABSO 0.03 10/05/2021     Lab Results   Component Value Date    MALBP 73.80 10/04/2021    CREUR 165.00 10/05/2021     La g, Oral, Daily PRN  senna (SENOKOT) syrup 17.6 mg, 10 mL, Oral, Nightly PRN  bisacodyl (DULCOLAX) rectal suppository 10 mg, 10 mg, Rectal, Daily PRN  Chlorhexidine Gluconate (PERIDEX) 0.12 % solution 15 mL, 15 mL, Mouth/Throat, [email or web address]  LORazepam (AT (PROTONIX) EC tab 40 mg, 40 mg, Oral, QAM AC  cefTRIAXone Sodium (ROCEPHIN) 2 g in sodium chloride 0.9% 100 mL IVPB-ADDV, 2 g, Intravenous, I97G  folic acid (FOLVITE) tab 1 mg, 1 mg, Oral, Daily        ASSESSMENT/PLAN:     65 yo M with history of GERD, HTN

## 2021-10-07 NOTE — PROGRESS NOTES
Critical Care Progress Note        NAME: Alvie Duane - ROOM: 039/954-D - MRN: [id number] - Age: 64year old - : 3/28/1960  Date of Admission: 10/4/2021 11:16 AM  Admission Diagnosis: Hepatic encephalopathy ([facility] [address] [K72.90]  Hyperkalemia [E87.5]  Hyponatr pantoprazole (PROTONIX) IV push  40 mg Intravenous QAM AC    Or   • pantoprazole  40 mg Oral QAM AC   • cefTRIAXone  2 g Intravenous D44H   • folic acid  1 mg Oral Daily     Continuous Infusing Medication:  • phenylephrine 100 mcg/min (10/07/21 0533)   • f following  -HD at their discretion  9. Cirrhosis  -due to EtOH  -GI following  -cont proph ceftriaxone  10. Anemia/Thrombocytopenia  -due to EtOH abuse  -monitor and transfuse as needed  11. Pulm HTN  -noted on echo  -recommend sleep study as opt  12.  Prop

## 2021-10-07 NOTE — PROGRESS NOTES
Left side PICC with tip in right atrium. PICC line withdrawn 2 cm and redressed in sterile fashion.     Lucretia Vázquez, ACNP  Critical Care NP  Phone [phone], pager [phone]

## 2021-10-07 NOTE — PROCEDURES
ELECTROENCEPHALOGRAM REPORT      Patient Name: Muna Medina   : 3/28/1960   Requesting Physician: Dr. Chon Pina   Date of Test: 10/6/21 at 7:30am until 10/7/21 at 7:30  History: 64year old with hepatic encephalopathy and unresponsiveness, with intermit [address]

## 2021-10-07 NOTE — PROGRESS NOTES
[address] Neurology Progress Note    Mia Claude Patient Status:  Inpatient    3/28/1960 MRN [id number]   [facility] 4SW-A Attending Naeem Nunez MD   Hosp Day # 3 PCP Tiff Florence MD       Chief Complaint:     Seizure lik persistent clinical concern then consider MRI.      CT head 10/4/21  FINDINGS:     VENTRICLES/SULCI:  Ventricles and sulci are normal in size for age with mild atrophy present.     INTRACRANIAL:  There are no abnormal extraaxial fluid collections. Sheryle Cash is Vitamin D deficiency     Irritable bowel syndrome with diarrhea     Hepatic encephalopathy (HCC)     Acute renal failure superimposed on chronic kidney disease, unspecified CKD stage, unspecified acute renal failure type ([facility] [address]     Hyponatremia     Hyperk direct patient intervention, complex decision making, and/or discussion with clinical staff.     Lorenzo Paez DO  Neuromuscular and General Neurology  [facility]  pager [phone]

## 2021-10-07 NOTE — PROGRESS NOTES
Assumed care at 1. Sedated. See flowsheet for further assessment. Continuous EEG. Neuro called this RN to state patient is essentially in status epilepticus. MD spoke to APN as well. See orders. CT brain completed -- APN notified.    Propofol drip in

## 2021-10-08 NOTE — PAYOR COMM NOTE
--------------  CONTINUED STAY REVIEW    Payor: [address] ANUSHA  Subscriber #:  [id number]  Authorization Number: [id number]      Admit date: 10/4/21  Admit time:  3:53 PM    FAXING CLINICAL UPDATE FOR 10/8/21    10/8/21   Critical Care Progress No 1,000 mg, 1,000 mg, Intravenous, Q12H  •  lacosamide (VIMPAT) injection 100 mg, 100 mg, Intravenous, Q12H  •  lactulose (CHRONULAC) 10 GM/15ML 200 g in Sterile Water for Irrigation 1,000 mL retention enema, 200 g, Rectal, 4 times per day  •  Phenytoin Sodi TID  •  propofol (DIPRIVAN) infusion,  mcg/kg/min (Dosing Weight), Intravenous, Continuous  •  vasopressin (PITRESSIN) 20 Units in sodium chloride 0.9% 50 mL infusion for septic shock, 0.04 Units/min, Intravenous, Continuous  •  heparin sodium 1000 U abuse  -monitor and transfuse as needed  10. Pulm HTN  -noted on echo  -recommend sleep study as opt  11. Proph  -heparin  12.  FEN  -NPO due to AMS  - TFs on hold due to high residuals and because of severity of shock

## 2021-10-08 NOTE — PROGRESS NOTES
[facility] Hospitalist Progress Note                                                                   [address]  3/28/1960    SUBJECTIVE: Mr. Nahid Norris now on four pressors and remains hypotensiv 7.3*   < > 8.9  --  8.8 8.7 6.2* 7.9*  --    MG 2.3  --  2.1  --   --   --  1.9  --   --  2.5  --    PHOS 3.9  --  4.6  --  3.3  --  2.8 3.2  --  3.2  --    *   < > 122*   < > 118*  --  132* 159* 188* 151*  --     < > = values in this interval not d norepinephrine 30 mcg/min (10/08/21 0033)   • propofol Stopped (10/08/21 0807)   • vasopressin (PITRESSIN) infusion for septic shock 0.04 Units/min (10/08/21 0434)     PRN: midazolam (Versed) 5 mg/mL HIGH DOSE bolus from bag, fentaNYL citrate **OR** fentaN doubt cardiogenic shock  --possibly septic; consider SBP  --urine culture negative; monitor blood cultures which are negative to date  --CXR without evidence of pneumonia  --no plan for diagnostic paracentesis per GI as it would not   --co

## 2021-10-08 NOTE — PROGRESS NOTES
[facility]  Progress Note    Micheline Mejia Patient Status:  Inpatient    3/28/1960 MRN [id number]   [facility] 4SW-A Attending Nicko Marcelo MD   Hosp Day # 4 PCP Bernadette Long MD     Subjective:  Micheline Mejia is a(n) 64year old m ETOH cirrhosis, HE, non responsive status epilepticus. MSOF. Grave prognosis. Plan:    1.   Cont supportive care    Dw family grave prognosis    Oniel Neff MD  10/8/2021  2:05 PM

## 2021-10-08 NOTE — PROGRESS NOTES
[id number] [address] Neu[facility] Progress Note    Prentis Say Patient Status:  Inpatient    3/28/1960 MRN [id number]   [facility] 4SW-A Attending Dayne Curiel MD   Hosp Day # 4 PCP Andrew Nielsen MD     NEUROLOGY   Attending Addendum Pro examination,  The APN/PA visit findings are all validated.   Important notes:  Intubated sedated   Could not get any response to pain stimuli on all limbs    Recent Results (from the past 24 hour(s))   POCT Glucose    Collection Time: 10/07/21  5:44 PM   Re 4.2 2.8 - 4.4 g/dL    A/G Ratio 0.6 (L) 1.0 - 2.0   CBC, Platelet;  No Differential    Collection Time: 10/07/21  9:37 PM   Result Value Ref Range    WBC 8.0 4.0 - 11.0 x10(3) uL    RBC 2.14 (L) 4.30 - 5.70 x10(6)uL    HGB 8.5 (L) 13.0 - 17.5 g/dL    HCT 21 Ref Range    Magnesium 2.5 1.6 - 2.6 mg/dL   Phosphorus    Collection Time: 10/08/21  4:55 AM   Result Value Ref Range    Phosphorus 3.2 2.5 - 4.9 mg/dL   Prothrombin Time (PT)    Collection Time: 10/08/21  4:55 AM   Result Value Ref Range    PT 22.7 (H) 1 Glucose 175 (H) 70 - 99 mg/dL       No results found. ASSESSMENT:  Presented with status epilepticus   Hepatic and multifactorial encephalopathy  GELA hyponatremia still with low BP    Discussion/Recommendations:      At some point will need an MRI to [facility] no response to noxious stimuli     Labs:  Lab Results   Component Value Date    WBC 10.5 10/08/2021    HGB 9.0 10/08/2021    HCT 21.9 10/08/2021    PLT 74.0 10/08/2021    CREATSERUM 1.78 10/08/2021    BUN 12 10/08/2021     10/08/2021    K 5.6 10/08/2 10/15/2021] thiamine  100 mg Oral Daily   • thiamine (VITAMIN B1) IVPB  500 mg Intravenous TID   • [START ON 10/12/2021] thiamine (VITAMIN B1) IVPB  300 mg Intravenous Q24H   • Octreotide Acetate  100 mcg Subcutaneous Q8H   • midodrine  10 mg Oral TID   •

## 2021-10-08 NOTE — PROGRESS NOTES
[address] Patient Status:  Inpatient    3/28/1960 MRN [id number]   [facility] 4SW-A Attending Conor Mann MD   [facility] Day # 4 PCP Matthew Joiner MD     Critical Care Progress Note     Date of Admission: 10/4/2021 11 NS premix infusion,  mcg/hr, Intravenous, Continuous PRN  •  PEG 3350 (MIRALAX) powder packet 17 g, 17 g, Oral, Daily PRN  •  senna (SENOKOT) syrup 17.6 mg, 10 mL, Oral, Nightly PRN  •  bisacodyl (DULCOLAX) rectal suppository 10 mg, 10 mg, Rectal, Da mL  PEEP/CPAP (cm H2O):  [5 cm H20] 5 cm H20  MAP (cm H2O):  [8.9-9.5] 9.2      Wt Readings from Last 3 Encounters:  10/05/21 : 227 lb 1.2 oz (103 kg)  05/14/21 : 212 lb (96.2 kg)  12/04/19 : 202 lb 6.4 oz (91.8 kg)       I/O last 3 completed shifts:   In: -intubated for airway protection as well as for shock, and pulmonary edema  - last EEG showed persistent seizure activity. Hold on SBT. - cont with full vent support  2. Lactic Acidosis- improved. -due to hypotension/Shock  -on CRRT  3.  Shock  -sepsis vs

## 2021-10-08 NOTE — PROCEDURES
ELECTROENCEPHALOGRAM REPORT      Patient Name: Marleni Mason   : 3/28/1960   Requesting Physician: Dr. Sheri Gimenez   Date of Test: 10/7/21 at 7:26am until 10/8/21 at 7:26am   History: 64year old male with hepatic encephalopathy, unresponsiveness, and rare Burst suppression pattern comes back at 7:15am.      Eileen Mcdonald, DO  Neuromuscular and General Neurology  [facility]

## 2021-10-08 NOTE — PROGRESS NOTES
Pt remains intubated on vent. Fi02 titrated prn. No spontaneous breathing over vent. No cough/gag. Versed and Propofol gtts titrated off. Only treating clinical seizures per neuro MD. Blood pressure remains low. Epi gtt started.  CRRT pulling 0 fluid (net +

## 2021-10-08 NOTE — PROGRESS NOTES
[facility]    Nephrology Progress Note    [facility] Attending:  Bettina Farmer MD       SUBJECTIVE:     Pt remains on 4 pressors including epi gtt and remains hypotensive. On ventilator and CVVH.   He continues to have high ammonia and lactic aci 10/08/2021    HGB 9.0 (L) 10/08/2021    HCT 21.9 (L) 10/08/2021    PLT 74.0 (L) 10/08/2021    .3 (H) 10/08/2021    MCH 34.6 (H) 10/08/2021    MCHC 33.2 10/08/2021    RDW 20.4 10/08/2021    NEPRELIM 7.10 10/05/2021    NEPERCENT 79.5 10/05/2021    LYP Continuous  MIDAZOLAM 5 MG/ML (HIGH DOSE) BOLUS FROM BAG 20.6 mg, 0.2 mg/kg (Dosing Weight), Intravenous, PRN  EPINEPHrine HCl (ADRENALIN) 4 mg in sodium chloride 0.9% 250 mL infusion, 1-10 mcg/min, Intravenous, Continuous  hydrocortisone Na succinate PF ( 0.9 % 10 mL IV push, 40 mg, Intravenous, QAM AC   Or  pantoprazole (PROTONIX) EC tab 40 mg, 40 mg, Oral, QAM AC  cefTRIAXone Sodium (ROCEPHIN) 2 g in sodium chloride 0.9% 100 mL IVPB-ADDV, 2 g, Intravenous, I81J  folic acid (FOLVITE) tab 1 mg, 1 mg, Oral, 00534    10/8/2021  1:09 PM

## 2021-10-08 NOTE — CONSULTS
[address] [facility]   [id number]  Hospital Day #4  Date of Consult:    10/7/2021      Reason for Consultation:      Consult requested by William BLOOD for evaluation of palliative care needs, goals hypertension    • Glucose intolerance (impaired glucose tolerance) 2012   • Hyperlipidemia    • West Nile Virus infection 2002     Past Surgical History:   Procedure Laterality Date   • COLONOSCOPY  01/11/2011   • PERIPHERAL VASCULAR SCREENING HISTORICAL C Weight), Intravenous, PRN  •  EPINEPHrine HCl (ADRENALIN) 4 mg in sodium chloride 0.9% 250 mL infusion, 1-10 mcg/min, Intravenous, Continuous  •  hydrocortisone Na succinate PF (SOLU-cortef) injection 100 mg, 100 mg, Intravenous, Q8H [address]  •  fentaNYL citra mg, Intravenous, QAM AC **OR** pantoprazole (PROTONIX) EC tab 40 mg, 40 mg, Oral, QAM AC  •  cefTRIAXone Sodium (ROCEPHIN) 2 g in sodium chloride 0.9% 100 mL IVPB-ADDV, 2 g, Intravenous, X76C  •  folic acid (FOLVITE) tab 1 mg, 1 mg, Oral, Daily  No current left upper extremity, tip in the right atrium. Previously demonstrated right jugular central venous catheter in the lower SVC, stable.   The endotracheal tube is present with the tip 2.3 cm above the charmaine,   and the nasogastric tube is present in the sto Assist Normal or Reduced Full or confused   30 Bedbound Extensive Disease  Can’t do any work Max Assist  Total Care Reduced Drowsy/confused   20 Bedbound Extensive Disease  Can’t do any work Max Assist  Total Care Minimal Drowsy/confused   10 Bedbound/coma indicates if the heart stops and no spontaneous breaths end of life is present, allow natural end of life. Javier Lorenz verbalized she does not want him to suffer from having CPR if it is not going to help him.  We discussed he has full ventilatory support, if Healthcare POA / HC surrogate:  Wife  - Psychosocial: Emotional support provided to the family     - Disposition: end of life expected in the hospital.     - Discussed today’s visit with Yaya Xiong and Dr. Nickie Stinson    Thank you for allowing Palliative Care se

## 2021-10-08 NOTE — PLAN OF CARE
Assumed care at [address] [place] [address]. Sedated. See flowsheet for further assessment. Continuous EEG. Propofol and versed drips infusing for status epilepticus. Esophageal temp probe in place. Hypothermic -- bear hugger in place. Vented.  Fentanyl drip infusing for sed

## 2021-10-08 NOTE — BH PROGRESS NOTE
Pt not seen due to his medical condition, non responsive. DAVID will check on the patient another time.

## 2021-10-09 NOTE — PROGRESS NOTES
[address] [address] Neu[facility] Progress Note    Michael Arcos Patient Status:  Inpatient    3/28/1960 MRN [id number]   [facility] 4SW-A Attending Romi Chen MD   Hosp Day # 5 PCP Yris Medrano MD       NEUROLOGY   Attending Addendu unreactive  corneals and doll's eye negative  No posturing to pain stimuli    Recent Results (from the past 24 hour(s))   POCT Glucose    Collection Time: 10/08/21 11:43 PM   Result Value Ref Range    POC Glucose 176 (H) 70 - 99 mg/dL   Ammonia, Plasma Slide Review Platelets reviewed on smear    POCT Glucose    Collection Time: 10/09/21  5:31 AM   Result Value Ref Range    POC Glucose 164 (H) 70 - 99 mg/dL   POCT Glucose    Collection Time: 10/09/21 11:26 AM   Result Value Ref Range    POC Glucose 148 (H increasing lethargy and confusion that started 6 days ago. His lactic acid was high, and he was noted to have ascites; now on pressors and antibiotics.  Last night, he became less responsive, and had an episode of right leg twitching lasting several seconds 10 mg, Rectal, Daily PRN  Chlorhexidine Gluconate (PERIDEX) 0.12 % solution 15 mL, 15 mL, Mouth/Throat, [email or web address]  Multivitamin Chewtab (ADULT) (CENTRUM) 1 tablet, 1 tablet, Oral, Daily  Norepinephrine Bitartrate (LEVOPHED) 16 mg in dextrose 5 % 250 mL No cough/gag/corneals/pupil response. No response to pain.       Imaging/Diagnostics:  MRI Brain when able    [address] [place] [address] 10/6 CONCLUSION:  Overall stable exam.  No acute intracranial hemorrhage is seen.  No new midline shift or mass effect.  If there is persistent

## 2021-10-09 NOTE — PROGRESS NOTES
[facility]  Progress Note    Sanket Downey Patient Status:  Inpatient    3/28/1960 MRN [id number]   [facility] 4SW-A Attending Andrew Reyes MD   Hosp Day # 5 PCP Giselle Myers MD     Subjective:  Sanket Downey is a(n) 64year old m no mental status off sedation. DNR      Plan:    1.   Supportive care, consider pursuing comfort care    Total time spent on patient care:  15 minutes    Lakshmi Bonner MD  10/9/2021  8:36 AM

## 2021-10-09 NOTE — PROGRESS NOTES
[facility]    Nephrology Progress Note    Ron Dobbs Attending:  Corie Castro MD       SUBJECTIVE:     Remains on 4 pressors  CRRT without any ability to remove fluids.     PHYSICAL EXAM:     Vital Signs: BP (!) 83/48   Pulse 66   Temp 98.4 °F (3 NE 7.10 10/05/2021    LYMABS 0.81 (L) 10/05/2021    MOABSO 0.79 10/05/2021    EOABSO 0.14 10/05/2021    BAABSO 0.03 10/05/2021     Lab Results   Component Value Date    MALBP 73.80 10/04/2021    CREUR 165.00 10/05/2021     Lab Results   Component Value injection 25 mcg, 25 mcg, Intravenous, Q30 Min PRN   Or  fentaNYL citrate (SUBLIMAZE) 0.05 MG/ML injection 50 mcg, 50 mcg, Intravenous, Q30 Min PRN  fentanyl (SUBLIMAZE) 1000 mcg/100 ml NS premix infusion,  mcg/hr, Intravenous, Continuous PRN  PEG 33 1.25 mg/dl as of 5/2021 presented with AMS. He is unable to provide history due to his mental status. His wife provides history.  Nephrology consulted for GELA to Cr 4.95 mg/dl, with Na 122, K 5.2, HCO3 15.     GELA on CKD stage 2: urine sodium 6, 1.3 g UPCR,

## 2021-10-09 NOTE — PROGRESS NOTES
Current vent settings noted below, no changes overnight.      10/09/21 0511   Vent Information   $ RT Standby Charge (per 15 min) 1  (vent)   Ventilator Initiation 10/06/21   Ventilation Day(s) 4   Interface Invasive   Vent Type    Vent -8   Vent

## 2021-10-09 NOTE — PLAN OF CARE
Received pt intubated sedated on Fentanyl. Pt q1h neuro checks with pupilometer. Pt has no gag/cough/corneals, does not withdraw to pain. Tolerating current vent settings, pt is not breathing over the vent. SR, remains on max Levo, vaso, Jc, and Epi.  Spok

## 2021-10-09 NOTE — PROGRESS NOTES
[facility] Hospitalist Progress Note                                                                   [address]  3/28/1960    SUBJECTIVE:   Pt intubated, unresponsive.     Supportive wife at be 100.9* 103.3* 104.5*   PLT  --    < > 101.0*   < > 86.0* 90.0* 82.0* 74.0* 88.0*   INR 1.36*  --  1.74*  --   --   --   --  1.95*  --     < > = values in this interval not displayed.        Recent Labs   Lab 10/05/21  0427 10/05/21  1422 10/06/21  0456 10/0 thiamine  100 mg Oral Daily   • thiamine (VITAMIN B1) IVPB  500 mg Intravenous TID   • [START ON 10/12/2021] thiamine (VITAMIN B1) IVPB  300 mg Intravenous Q24H   • Octreotide Acetate  100 mcg Subcutaneous Q8H   • midodrine  10 mg Oral TID   • pantoprazole withdrawal seizures  --continuous EEG with ongoing seizure activity despite multiple antiepileptic drugs  --propofol drip discontinued; continue versed drip  --continue IV vimpat, IV dilantin, IV keppra  --continuous EEG   --appreciate neurology management is critically ill and at risk for decompensation and death. DNAR/full Tx. Wife aware of eminent death. She does not want to withdrawal care yet. Continue goals of care discussions.      MD Evette Dang  and [facility]  Internal Medicine, Hospitalist

## 2021-10-09 NOTE — PROGRESS NOTES
[address] Patient Status:  Inpatient    3/28/1960 MRN [id number]   [facility] 4SW-A Attending Marianna Jackson MD   [address] Day # 5 PCP Emmanuel Dubois MD     Critical Care Progress Note     Date of Admission: 10/4/2021 11 mcg/100 ml NS premix infusion,  mcg/hr, Intravenous, Continuous PRN  •  PEG 3350 (MIRALAX) powder packet 17 g, 17 g, Oral, Daily PRN  •  senna (SENOKOT) syrup 17.6 mg, 10 mL, Oral, Nightly PRN  •  bisacodyl (DULCOLAX) rectal suppository 10 mg, 10 mg, mL] 500 mL  PEEP/CPAP (cm H2O):  [5 cm H20] 5 cm H20  MAP (cm H2O):  [9.2-16] 9.6      Wt Readings from Last 3 Encounters:  10/09/21 : 243 lb 2.7 oz (110.3 kg)  05/14/21 : 212 lb (96.2 kg)  12/04/19 : 202 lb 6.4 oz (91.8 kg)       I/O last 3 completed shif pressors. 4. Status Epilepticus- on dilantin, vimpat, keppra  - seized for >24hrs  -CT head neg for bleeding  -neuro following  -versed for IV sedation  -avoid propofol bc of hypertrigs  continuous EEG  5. Encephalopathy- TME.   Hepatic component contribut

## 2021-10-09 NOTE — PLAN OF CARE
Received patient resting in bed, intubated on ventilator. No sedation. Pt has no gag or cough and does not withdraw from pain. Q1H neuro checks with pupilometer. Sinus rhythm. Levo, vaso, renzo and epi at maximum support.  Worsening hypotension throughout pelon

## 2021-10-10 NOTE — PROGRESS NOTES
[facility]  Progress Note    Erasmo Interiano Patient Status:  Inpatient    3/28/1960 MRN [id number]   [facility] 4SW-A Attending Bob Mercer MD   [facility] Day # 6 PCP Yonathan Wiseman MD     Subjective:  Erasmo Interiano is a(n) 64year old wife      Lukas Rothman MD  10/10/2021  10:14 AM

## 2021-10-10 NOTE — PROCEDURES
[facility]  In affiliation with [facility]  [address]  [place], [address] [facility]  [phone]   Fax:  [phone]    Long term/ambulatory EEG Monitoring Report    [facility]  3/28/1960  Referring Michael PISANO

## 2021-10-10 NOTE — PROGRESS NOTES
[facility]    Nephrology Progress Note    Yuridia Valles Attending:  Kaity Maya MD       SUBJECTIVE:     Remains on 4 pressors  CRRT without any ability to remove fluids.   Hypotensive in the 50's    PHYSICAL EXAM:     Vital Signs: BP (!) 83/48   P 10/10/2021    BAPERCENT 0.3 10/10/2021    NE 17.75 (H) 10/10/2021    LYMABS 1.08 10/10/2021    MOABSO 1.55 (H) 10/10/2021    EOABSO 0.00 10/10/2021    BAABSO 0.06 10/10/2021     Lab Results   Component Value Date    MALBP 73.80 10/04/2021    CREUR 165.00 1 MG/ML injection 50 mcg, 50 mcg, Intravenous, Q30 Min PRN  fentanyl (SUBLIMAZE) 1000 mcg/100 ml NS premix infusion,  mcg/hr, Intravenous, Continuous PRN  PEG 3350 (MIRALAX) powder packet 17 g, 17 g, Oral, Daily PRN  senna (SENOKOT) syrup 17.6 mg, 10 m status. His wife provides history.  Nephrology consulted for GELA to Cr 4.95 mg/dl, with Na 122, K 5.2, HCO3 15.     GELA on CKD stage 2: urine sodium 6, 1.3 g UPCR, CT a/p without obstruction  -- empiric albumin 25% in case of HRS completed x2 days -> starte

## 2021-10-10 NOTE — PLAN OF CARE
Received pt intubated, no sedation, remains unresponsive, no cough/gag/corneals, now on 100 % on the vent. Remains on max of Levophed, Vasopressin, Jc, and Epi. BP has remained in the 50's throughout the night. All pulses via doppler. Ngt with no output.

## 2021-10-10 NOTE — PLAN OF CARE
Patient received intubated on ventilator. No sedation. Unresponsive, no cough/gag. Sinus rhythm. Levo, vaso, renzo and epi at maximum support. Worsening hypotension throughout shift. SpO2 decreased throughout shift.   Remains on CRRT with no fluid removal. NG

## 2021-10-10 NOTE — PROGRESS NOTES
[address] Patient Status:  Inpatient    3/28/1960 MRN [id number]   [facility] 4SW-A Attending Terry Whatley MD   Robb Fill Day # 6 PCP Yris Medrano MD     Critical Care Progress Note     Date of Admission: 10/4/2021 11 17 g, 17 g, Oral, Daily PRN  •  senna (SENOKOT) syrup 17.6 mg, 10 mL, Oral, Nightly PRN  •  bisacodyl (DULCOLAX) rectal suppository 10 mg, 10 mg, Rectal, Daily PRN  •  Chlorhexidine Gluconate (PERIDEX) 0.12 % solution 15 mL, 15 mL, Mouth/Throat, [email or web address] Encounters:  10/09/21 : 243 lb 2.7 oz (110.3 kg)  05/14/21 : 212 lb (96.2 kg)  12/04/19 : 202 lb 6.4 oz (91.8 kg)       I/O last 3 completed shifts: In: 7739.3 [I.V.:3909.3; Other:3300; NG/GT:280; IV PIGGYBACK:250]  Out: 3255 [Emesis/NG output:525;  Other: but also prolonged seizure and shock also contributing factor. On heavy sedation to help break status epilepticus so current neuro exam is unreliable  -cont lactulose-   -monitor ammonia level  -neuro following  6.  GELA -possibly hepatorenal process  -on C

## 2021-10-10 NOTE — PROGRESS NOTES
Received pt vented, VC+ 16/500/50%/+5, tolerating well. Towards the end of the shift pts O2 requirements increased and FiO2 is now at 100%. Plans to possibly proceed with comfort care this weekend. Will continue to monitor closely.       10/10/21 [address] [place] [address]

## 2021-10-10 NOTE — PROGRESS NOTES
KAELA Hospitalist Progress Note     [facility]      SUBJECTIVE:  Remains on pressors, intubated in ICU    OBJECTIVE:  Temp:  [96.4 °F (35.8 °C)-99.7 °F (37.6 °C)] 98.4 °F (36.9 °C)  Pulse:  [66-81] 78  Resp:  [15-17] 16  AO: (53-78)/(34-39) 54/38  FiO --  2.7*  --  2.4   PHOS 4.6   < > 2.8   < > 3.2  --   --  3.2  --  1.9* 2.4* 2.5   *   < > 132*   < > 159*   < > 188* 151*  --  163* 140* 119*    < > = values in this interval not displayed.        Recent Labs   Lab 10/06/21  8396 10/06/21  1415 10/ there is persistent clinical concern then consider MRI.    Dictated by (CST): Chanel Álvarez MD on 10/06/2021 at 10:54 PM     Finalized by (CST): Chanel Álvarez MD on 10/06/2021 at 10:55 PM       CT BRAIN OR HEAD (75405)    Result Date: 10/4/2021  PROCEDURE:  CT BR transmitted to the [facility]) [address]) which includes the Dose Index Registry.   PATIENT STATED HISTORY: (As transcribed by Technologist)  Patient presents with hepatic encephalopathy, labored breathin [address], [facility] [address], [address]                                                               [phone] ---------------------------------------------------------------------------- Transthoracic Echo estimated ejection fraction was 55-60%. The study is not technically sufficient to allow evaluation of LV diastolic function. Left atrium:  The left atrial volume was normal. Right ventricle:   The cavity size was normal. Systolic function was normal. Systo fraction                            64    %      >=55   Ventricular septum                              Value        Reference  IVS thickness, ED, PLAX                         0.8   cm     0.6 - 1.0   Aorta                                           Value (CPT=71045)  TECHNIQUE:  AP chest radiograph was obtained.   COMPARISON:  [facility] , XR, XR CHEST AP PORTABLE  (CPT=71045), 10/06/2021, 3:48 AM.  INDICATIONS:  verify PICC insertion  PATIENT STATED HISTORY: (As transcribed by Technologist)  Patient offered n AP chest radiograph was obtained.   COMPARISON:  [facility] , XR, XR CHEST AP PORTABLE  (CPT=71045), 10/04/2021, 6:07 PM.  INDICATIONS:  s/p Dialysis Catheter  PATIENT STATED HISTORY: (As transcribed by Technologist)  Patient offered no additional history at  this time. FINDINGS:  Low lung volumes. Normal heart size and pulmonary vascularity. Bibasilar subsegmental atelectasis. CONCLUSION:  Shallow inspiration and mild bibasilar atelectasis.     Dictated by (CST): Catrina Loredo MD on 10/04/2021 a L/hr, CRRT, Continuous  phenylephrine HCl (UNA-SYNEPHRINE) 250 mg in sodium chloride 0.9% 250 mL infusion,  mcg/min, Intravenous, Continuous  levETIRAcetam (KEPPRA) injection 1,000 mg, 1,000 mg, Intravenous, Q12H  lacosamide (VIMPAT) injection 100 mg TID  propofol (DIPRIVAN) infusion,  mcg/kg/min (Dosing Weight), Intravenous, Continuous  vasopressin (PITRESSIN) 20 Units in sodium chloride 0.9% 50 mL infusion for septic shock, 0.04 Units/min, Intravenous, Continuous  heparin sodium 1000 UNIT/ML in intubated for airway protection in setting of above  - ventilator management per critical car     # Hyponatremia  - suspect hypervolemic due to decompensated cirrhosis  - continue scheduled IV albumin q6  - fluid management with CRRT per nephrology     # S

## 2021-10-11 NOTE — BH PROGRESS NOTE
Dionne Hadley will not be seeing the pt due to Daniela Pfeiffer being on the vent. Will check another time.

## 2021-10-11 NOTE — PROGRESS NOTES
Received pt vented, VC+ 16/500/50%/+5. No changes made. Possible plans to withdraw care later today. Will continue to monitor closely.       10/11/21 0327   Vent Information   $ RT Standby Charge (per 15 min) 1   $ Vent Care / Non-Invasive Subsequent Day Duane Padilla

## 2021-10-11 NOTE — DISCHARGE SUMMARY
General Medicine Discharge Summary     Patient ID:  Ryan Emmanuel  64year old  3/28/1960    Admit date: 10/4/2021    Discharge date and time: 10/11/2021  9:45 AM     Attending Physician: Noa Vernon MD    Primary Care Physician: Nelsy Hay MD mg total) by mouth daily. , Normal, Disp-90 tablet, R-1    losartan 100 MG Oral Tab  TAKE ONE TABLET BY MOUTH DAILY., Normal, Disp-90 tablet, R-0    Thiamine 50 MG Oral Cap  Take 1 capsule by mouth daily. , Historical, Disp-90 capsule, R-3    OMEPRAZOLE 20 M

## 2021-10-11 NOTE — PLAN OF CARE
Assumed care of patient following shift report. Patient remains unarousable despite being off sedation. Blood pressures in the 30s upon start of shift despite 4 pressors being maxed. Patients family at bedside, seem to be aware of patient condition.  Remain

## 2021-10-12 NOTE — PAYOR COMM NOTE
--------------  CONTINUED STAY REVIEW    Payor: [address] [facility]  Subscriber #:  [id number]  Authorization Number: [id number]      Admit date: 10/4/21  Admit time:  3:53 PM    FAXING CLINICAL UPDATE FOR 10/9/21- 10/10/21    10/9/21  Critical Care Pr 10/09/2021     TP 6.0 10/09/2021    ASSESSMENT/PLAN:  1. Acute Resp Failure - intubated for airway protection as well as for refractory shock and pulmonary edema  - last EEG showed persistent seizure activity. - cont with full vent support  2.  Lactic Acid 2-5 L/hr, CRRT, Continuous **AND** CRRT Replacement Fluid 4/2.5, , , Until Discontinued  •  phenylephrine HCl (UNA-SYNEPHRINE) 250 mg in sodium chloride 0.9% 250 mL infusion,  mcg/min, Intravenous, Continuous  •  levETIRAcetam (KEPPRA) injection 1,00 Octreotide Acetate (sandoSTATIN) injection 100 mcg, 100 mcg, Subcutaneous, Q8H  •  midodrine (PROAMATINE) tab 10 mg, 10 mg, Oral, TID  •  propofol (DIPRIVAN) infusion,  mcg/kg/min (Dosing Weight), Intravenous, Continuous  •  vasopressin (PITRESSIN) 2 intubated for airway protection as well as for refractory shock and pulmonary edema  - last EEG showed flat line  - family contemplating withdrawal today or tomorrow, cont with full vent support for now  2.  Lactic Acidosis- improved with CRRT  -due to hypo

## 2021-10-19 NOTE — PAYOR COMM NOTE
--------------  DISCHARGE REVIEW    Payor: [address] [employer]  Subscriber #:  [id number]  Authorization Number: [id number]      Admit date: 10/4/21  Admit time:   3:53 PM  Discharge Date: 10/11/2021  5:13 AM     Admitting Physician: Shane Pastrana PULMONOLOGY  IP CONSULT TO NEPHROLOGY  IP CONSULT TO PHARMACY  IP CONSULT TO FOOD AND NUTRITION SERVICES  IP CONSULT TO GASTROENTEROLOGY  IP CONSULT TO NEUROLOGY  IP CONSULT TO PHARMACY  IP CONSULT PALLIATIVE CARE  IP CONSULT TO PHARMACY    Operative Proce mouth daily. , Historical    Total Time Coordinating Care: Greater than 30 minutes    Patient had opportunity to ask questions and state understand and agree with therapeutic plan as outlined above.      Thank Tina Sahni MD      10/10    Subject ill.        Plan:     1. Supportive care with likely comfort care.   DW wife        Dutch Vasquez MD  10/10/2021  10:14 AM    10/9/21  Critical Care Progress Note     Date of Admission: 10/4/2021 11:16 AM  Admission Diagnosis: Hepatic encephalopathy ([facility] [address] Intravenous, Continuous PRN  •  PEG 3350 (MIRALAX) powder packet 17 g, 17 g, Oral, Daily PRN  •  senna (SENOKOT) syrup 17.6 mg, 10 mL, Oral, Nightly PRN  •  bisacodyl (DULCOLAX) rectal suppository 10 mg, 10 mg, Rectal, Daily PRN  •  Chlorhexidine Gluconate cm H20  MAP (cm H2O):  [9.2-16] 9.6      Wt Readings from Last 3 Encounters:  10/09/21 : 243 lb 2.7 oz (110.3 kg)  05/14/21 : 212 lb (96.2 kg)  12/04/19 : 202 lb 6.4 oz (91.8 kg)        I/O last 3 completed shifts: In: 42645.1 [I.V.:7152.8;  Other:3300; NG pressors. 4. Status Epilepticus- on dilantin, vimpat, keppra  - seized for >24hrs  -CT head neg for bleeding  -neuro following  -versed for IV sedation  -avoid propofol bc of hypertrigs  continuous EEG  5. Encephalopathy- TME.   Hepatic component contribut

## 2022-01-28 NOTE — ED NOTES
When asked pt if he wanted help or to speak with someone regarding his drinking pt stated no Non-Graft Cartilage Fenestration Text: The cartilage was fenestrated with a 2mm punch biopsy to help facilitate healing.

## (undated) NOTE — ED AVS SNAPSHOT
Christianne Hunt   MRN: [id number]    Department:  [facility] Emergency Department   Date of Visit:  8/31/2019           Disclosure     Insurance plans vary and the physician(s) referred by the ER may not be covered by your plan.  Please contact your tell this physician (or your personal doctor if your instructions are to return to your personal doctor) about any new or lasting problems. The primary care or specialist physician will see patients referred from the [facility] Emergency Department.  Shelton Lombard

## (undated) NOTE — LETTER
[address]  [address] [facility] [address] [facility]  Authorization for Surgical Operation and Procedure     Date:___________                                                                                                         Time:__________ that can occur: fever and allergic reactions, hemolytic reactions, transmission of diseases such as Hepatitis, AIDS and Cytomegalovirus (CMV) and fluid overload.   In the event that I wish to have an autologous transfusion of my own blood, or a directed don when the applicable recovery period ends for purposes of reinstating the DNAR order.   10. Patients having a sterilization procedure: I understand that if the procedure is successful the results will be permanent and it will therefore be impossible for me t give me medicine and do additional procedures as necessary. Some examples are: Starting or using an “IV” to give me medicine, fluids or blood during my procedure, and having a breathing tube placed to help me breathe when I’m asleep (intubation).  In the ev rare but potential complications include headache, bleeding, infection, seizure, irregular heart rhythms, and nerve injury.     I can change my mind about having anesthesia services at any time before I get the medicine.    _________________________________

## (undated) NOTE — LETTER
[address] [facility]     I agree to have a Peripherally Inserted Central Catheter (PICC) placed in my arm.    1. The PICC insertion procedure, care, maintenance, risks, benefits, and complications have been explained to me by my advanc including risks, benefits, and side effects related to the alternatives and risks related to not receiving this procedure. 8.  I have expressed any questions about this procedure to my physician or the PICC Proceduralist and he/she has answered them.   I